# Patient Record
Sex: FEMALE | Race: WHITE
[De-identification: names, ages, dates, MRNs, and addresses within clinical notes are randomized per-mention and may not be internally consistent; named-entity substitution may affect disease eponyms.]

---

## 2017-05-27 ENCOUNTER — HOSPITAL ENCOUNTER (INPATIENT)
Dept: HOSPITAL 84 - D.ER | Age: 77
LOS: 3 days | Discharge: HOME | DRG: 392 | End: 2017-05-30
Attending: FAMILY MEDICINE | Admitting: FAMILY MEDICINE
Payer: MEDICARE

## 2017-05-27 VITALS
BODY MASS INDEX: 27.78 KG/M2 | WEIGHT: 177 LBS | HEIGHT: 67 IN | BODY MASS INDEX: 27.78 KG/M2 | BODY MASS INDEX: 27.78 KG/M2

## 2017-05-27 DIAGNOSIS — K21.0: Primary | ICD-10-CM

## 2017-05-27 DIAGNOSIS — I10: ICD-10-CM

## 2017-05-27 DIAGNOSIS — K91.89: ICD-10-CM

## 2017-05-27 DIAGNOSIS — E11.9: ICD-10-CM

## 2017-05-27 DIAGNOSIS — Z79.84: ICD-10-CM

## 2017-05-27 DIAGNOSIS — K22.70: ICD-10-CM

## 2017-05-27 DIAGNOSIS — K44.9: ICD-10-CM
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
## 2017-05-27 DIAGNOSIS — D62: ICD-10-CM

## 2017-05-27 DIAGNOSIS — K22.8: ICD-10-CM

## 2017-05-27 LAB
ALBUMIN SERPL-MCNC: 3.3 G/DL (ref 3.4–5)
ALP SERPL-CCNC: 82 U/L (ref 46–116)
ALT SERPL-CCNC: 37 U/L (ref 10–68)
ANION GAP SERPL CALC-SCNC: 13.4 MMOL/L (ref 8–16)
BASOPHILS NFR BLD AUTO: 0.3 % (ref 0–2)
BILIRUB SERPL-MCNC: 0.3 MG/DL (ref 0.2–1.3)
BUN SERPL-MCNC: 33 MG/DL (ref 7–18)
CALCIUM SERPL-MCNC: 9.2 MG/DL (ref 8.5–10.1)
CHLORIDE SERPL-SCNC: 103 MMOL/L (ref 98–107)
CO2 SERPL-SCNC: 27.2 MMOL/L (ref 21–32)
CREAT SERPL-MCNC: 1.2 MG/DL (ref 0.6–1.3)
EOSINOPHIL NFR BLD: 0.9 % (ref 0–7)
ERYTHROCYTE [DISTWIDTH] IN BLOOD BY AUTOMATED COUNT: 14.4 % (ref 11.5–14.5)
GLOBULIN SER-MCNC: 2.8 G/L
GLUCOSE SERPL-MCNC: 96 MG/DL (ref 74–106)
HCT VFR BLD CALC: 33 % (ref 36–48)
HGB BLD-MCNC: 11.1 G/DL (ref 12–16)
IMM GRANULOCYTES NFR BLD: 0.2 % (ref 0–5)
LYMPHOCYTES NFR BLD AUTO: 32.7 % (ref 15–50)
MCH RBC QN AUTO: 30 PG (ref 26–34)
MCHC RBC AUTO-ENTMCNC: 33.6 G/DL (ref 31–37)
MCV RBC: 89.2 FL (ref 80–100)
MONOCYTES NFR BLD: 7.5 % (ref 2–11)
NEUTROPHILS NFR BLD AUTO: 58.4 % (ref 40–80)
OSMOLALITY SERPL CALC.SUM OF ELEC: 285 MOSM/KG (ref 275–300)
PLATELET # BLD: 231 10X3/UL (ref 130–400)
PMV BLD AUTO: 9.6 FL (ref 7.4–10.4)
POTASSIUM SERPL-SCNC: 3.6 MMOL/L (ref 3.5–5.1)
PROT SERPL-MCNC: 6.1 G/DL (ref 6.4–8.2)
RBC # BLD AUTO: 3.7 10X6/UL (ref 4–5.4)
SODIUM SERPL-SCNC: 140 MMOL/L (ref 136–145)
WBC # BLD AUTO: 11.9 10X3/UL (ref 4.8–10.8)

## 2017-05-27 NOTE — OP
PATIENT NAME:  HANNAH COTA                           MEDICAL RECORD: [id number]
:42                                             LOCATION:[id number]
                                                         ADMISSION DATE:17
SURGEON:  JOSSUE BARRIOS MD            
 
 
DATE OF OPERATION:  2017
 
PREOPERATIVE DIAGNOSIS:  Upper gastrointestinal bleeding.
 
POSTOPERATIVE DIAGNOSES:
1.  Upper gastrointestinal bleeding with either a recurrent large hiatal hernia
or slipped Nissen.
2.  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
esophagus.
 
PROCEDURE:  Esophagogastroduodenoscopy with antral and distal esophageal
biopsies.
 
SURGEON:  Jossue Barrios MD.
 
ASSISTANT:  None.
 
BLOOD LOSS:  Minimal.
 
ANESTHESIA:  IV sedation.
 
COMPLICATIONS:  None.
 
Reason for the anesthesia staff being present during the procedure includes the
possible need for airway manipulation during the procedure, which was necessary.
 
The patient underwent a laparoscopic Nissen fundoplication by Dr. Alcaraz some
time last year.  She presents with gastrointestinal bleeding as well as
epigastric pain.  The examination was limited as the patient had a lot of
coughing and had episodes of desaturation during the procedure, so I was unable
to obtain as many distal esophageal biopsies as I would like to have been able
to obtain.  I noted no duodenal ulceration.  No gastric ulceration.
 
It was a little difficult to determine whether there had been a slipped Nissen
or a large recurrence of a hiatal hernia.  My recommendation to Dr. Daniels is to
continue with proton pump inhibitor therapy and refer the patient back to Dr. Alcaraz for possible revision of the operative procedure.
 
Also, I think the patient is going to require an upper GI and the radiology
department is not going to be willing to do this after the distal esophageal
biopsies today.
 
ENDOSCOPIC COURSE:  The patient was conveyed to the endoscopy suite urgently on
2017.  IV sedation was induced by anesthesia staff.  A bite block was
inserted.  A gastroscope was inserted into the mouth.  It was advanced easily
into the hypopharynx.  The esophagus was easily intubated as were the stomach
and duodenum.  Upon withdrawal, retroflexed and angulus views were obtained. 
Antral biopsies were obtained.  Distal esophageal biopsies were obtained.  The
endoscope was then withdrawn under direct vision.
 
The patient can be transferred back to our floor.  I anticipate she will likely
be able to be dismissed home tomorrow.
 
 
 
OPERATIVE REPORT                               [id number]    HANNAH COTA         
 
 
 
TRANSINT:[id number] Voice Confirmation ID: [id number] DOCUMENT ID: [id number]
                                           
                                           JOSSUE BARRIOS MD            
 
 
 
 
 
 
 
 
 
 
 
 
 
 
 
 
 
 
 
 
 
 
 
 
 
 
 
 
 
 
 
 
 
 
 
 
 
 
 
 
 
 
 
CC: ANNE LOZANO MD, MARIA GUADALUPE BRADSHAW MD, OLE DANIELS MD and [id number] MD
DICTATION DATE: 17 100     :     17 1136      ADM IN  
                                                                              
[facility]                                          
[address] [place] [address]

## 2017-05-27 NOTE — CN
PATIENT NAME:HANNAH COTA                               MEDICAL RECORD: [id number]
: 42                                              LOCATION:[facility]
ADMIT DATE: 17                                       ACCOUNT: [id number]
CONSULTING PHYSICIAN:    JOSSUE BARRIOS MD            
                                               
REFERRING PHYSICIAN:     ANNE LOZANO MD          
 
 
DATE OF CONSULTATION:  2017
 
HISTORY OF PRESENT ILLNESS:  The patient was admitted through the Emergency
Room.  This is a patient of Dr. Bradshaw.  She presented through the Emergency
Room with an upper GI bleed.  She had been having some hematemesis.  She had
been vomiting up clots for several days.  She also noted some very dark stools. 
She underwent a laparoscopic Nissen fundoplication by Dr. Alcaraz, she states
about a year ago.
 
She was evaluated in the Emergency Room by Dr. Padilla Quiroz as well as Dr. Hermosillo.  The symptoms are moderate severity.  She has been having some
epigastric pain.  She does have a history of peptic ulcer disease.  No recent
use of nonsteroidal anti-inflammatories.  Nothing aggravates.  Nothing
alleviates.  Symptoms are difficult to characterize.
 
She has been placed on proton pump inhibitor drip.  I am going to plan for an
upper endoscopy.
 
PAST MEDICAL AND SURGICAL HISTORY:  She wears glasses, non-insulin-dependent
diabetes mellitus, hypothyroidism, on replacement therapy, gastroesophageal
reflux, history of Nissen fundoplication, history of gastrointestinal bleeding,
history of dental implants, history of foot surgery.
 
ALLERGIES:  PENICILLIN AND SULFA.
 
HOME MEDICINES:  Levothyroxine, metformin, diltiazem, hydroxyzine as well as
amitriptyline.
 
FAMILY HISTORY:  Unknown.
 
SOCIAL HISTORY:  Never smoked tobacco.
 
REVIEW OF SYSTEMS:  Positive for fatigue and weakness.  No headache, no fever. 
Positive for nausea, hematemesis, abdominal pain.  No acute kidney disease, no
sweating, no rash.  No arthritis.
 
PHYSICAL EXAMINATION:
GENERAL:  She does appear acutely ill.  Does not appear chronically ill.
VITAL SIGNS:  Reviewed.
HEAD:  External ears appear normal.
EYES:  Extraocular movements are intact.
NECK:  Trachea is midline.
CHEST:  No intercostal retractions.
PULMONARY:  Nonlabored, no stridor.
ABDOMEN:  Nontender.
EXTREMITIES:  No peripheral cyanosis.
 
IMPRESSION:  Upper gastrointestinal bleeding.  The patient has had history of
peptic ulcer disease and Nissen fundoplication.
 
 
 
 
CONSULT REPORT                                 [id number] GALLOWAY             
 
 
PLAN:  Proton pump inhibitor drip.  EGD.  I have discussed this case personally
with Dr. Lozano.
 
TRANSINT:[id number] Voice Confirmation ID: [id number] DOCUMENT ID: [id number]
                                           
                                           JOSSUE BARRIOS MD            
 
 
 
 
 
 
 
 
 
 
 
 
 
 
 
 
 
 
 
 
 
 
 
 
 
 
 
 
 
 
 
 
 
 
 
 
 
 
 
 
 
CC: ANNE LOZANO MD and MARIA GUADALUPE BRADSHAW MD                  [id number]
DICTATION DATE: 17 0951     :     17 1146      ADM IN  
                                                                              
[place] [id number] [place] [id number]

## 2017-05-28 VITALS — DIASTOLIC BLOOD PRESSURE: 66 MMHG | SYSTOLIC BLOOD PRESSURE: 134 MMHG

## 2017-05-28 VITALS — DIASTOLIC BLOOD PRESSURE: 77 MMHG | SYSTOLIC BLOOD PRESSURE: 117 MMHG

## 2017-05-28 VITALS — DIASTOLIC BLOOD PRESSURE: 55 MMHG | SYSTOLIC BLOOD PRESSURE: 116 MMHG

## 2017-05-28 VITALS — DIASTOLIC BLOOD PRESSURE: 63 MMHG | SYSTOLIC BLOOD PRESSURE: 128 MMHG

## 2017-05-28 VITALS — DIASTOLIC BLOOD PRESSURE: 63 MMHG | SYSTOLIC BLOOD PRESSURE: 126 MMHG

## 2017-05-28 VITALS — SYSTOLIC BLOOD PRESSURE: 107 MMHG | DIASTOLIC BLOOD PRESSURE: 69 MMHG

## 2017-05-28 VITALS — SYSTOLIC BLOOD PRESSURE: 124 MMHG | DIASTOLIC BLOOD PRESSURE: 71 MMHG

## 2017-05-28 VITALS — SYSTOLIC BLOOD PRESSURE: 124 MMHG | DIASTOLIC BLOOD PRESSURE: 65 MMHG

## 2017-05-28 VITALS — SYSTOLIC BLOOD PRESSURE: 135 MMHG | DIASTOLIC BLOOD PRESSURE: 70 MMHG

## 2017-05-28 VITALS — DIASTOLIC BLOOD PRESSURE: 63 MMHG | SYSTOLIC BLOOD PRESSURE: 124 MMHG

## 2017-05-28 VITALS — SYSTOLIC BLOOD PRESSURE: 125 MMHG | DIASTOLIC BLOOD PRESSURE: 65 MMHG

## 2017-05-28 VITALS — SYSTOLIC BLOOD PRESSURE: 128 MMHG | DIASTOLIC BLOOD PRESSURE: 69 MMHG

## 2017-05-28 LAB
ANION GAP SERPL CALC-SCNC: 11.7 MMOL/L (ref 8–16)
BASOPHILS NFR BLD AUTO: 0.4 % (ref 0–2)
BASOPHILS NFR BLD AUTO: 0.5 % (ref 0–2)
BUN SERPL-MCNC: 31 MG/DL (ref 7–18)
CALCIUM SERPL-MCNC: 8 MG/DL (ref 8.5–10.1)
CHLORIDE SERPL-SCNC: 109 MMOL/L (ref 98–107)
CO2 SERPL-SCNC: 26.5 MMOL/L (ref 21–32)
CREAT SERPL-MCNC: 0.9 MG/DL (ref 0.6–1.3)
EOSINOPHIL NFR BLD: 1.1 % (ref 0–7)
EOSINOPHIL NFR BLD: 1.4 % (ref 0–7)
ERYTHROCYTE [DISTWIDTH] IN BLOOD BY AUTOMATED COUNT: 14.5 % (ref 11.5–14.5)
ERYTHROCYTE [DISTWIDTH] IN BLOOD BY AUTOMATED COUNT: 14.9 % (ref 11.5–14.5)
GLUCOSE SERPL-MCNC: 102 MG/DL (ref 74–106)
HCT VFR BLD CALC: 24.4 % (ref 36–48)
HCT VFR BLD CALC: 24.8 % (ref 36–48)
HGB BLD-MCNC: 8.3 G/DL (ref 12–16)
HGB BLD-MCNC: 8.5 G/DL (ref 12–16)
IMM GRANULOCYTES NFR BLD: 0.1 % (ref 0–5)
IMM GRANULOCYTES NFR BLD: 0.2 % (ref 0–5)
LYMPHOCYTES NFR BLD AUTO: 33 % (ref 15–50)
LYMPHOCYTES NFR BLD AUTO: 35.3 % (ref 15–50)
MCH RBC QN AUTO: 30.2 PG (ref 26–34)
MCH RBC QN AUTO: 30.6 PG (ref 26–34)
MCHC RBC AUTO-ENTMCNC: 34 G/DL (ref 31–37)
MCHC RBC AUTO-ENTMCNC: 34.3 G/DL (ref 31–37)
MCV RBC: 88.3 FL (ref 80–100)
MCV RBC: 90 FL (ref 80–100)
MONOCYTES NFR BLD: 4.1 % (ref 2–11)
MONOCYTES NFR BLD: 5.4 % (ref 2–11)
NEUTROPHILS NFR BLD AUTO: 58.7 % (ref 40–80)
NEUTROPHILS NFR BLD AUTO: 59.8 % (ref 40–80)
OSMOLALITY SERPL CALC.SUM OF ELEC: 293 MOSM/KG (ref 275–300)
PLATELET # BLD: 170 10X3/UL (ref 130–400)
PLATELET # BLD: 177 10X3/UL (ref 130–400)
PMV BLD AUTO: 9.1 FL (ref 7.4–10.4)
PMV BLD AUTO: 9.2 FL (ref 7.4–10.4)
POTASSIUM SERPL-SCNC: 3.2 MMOL/L (ref 3.5–5.1)
RBC # BLD AUTO: 2.71 10X6/UL (ref 4–5.4)
RBC # BLD AUTO: 2.81 10X6/UL (ref 4–5.4)
SODIUM SERPL-SCNC: 144 MMOL/L (ref 136–145)
WBC # BLD AUTO: 6.2 10X3/UL (ref 4.8–10.8)
WBC # BLD AUTO: 7.9 10X3/UL (ref 4.8–10.8)

## 2017-05-28 NOTE — NUR
SBFT COMPLETE.PT HAS HAD SHOWER AND PUT BACK IN BED.SHE HAD 1 LARGE LIQUID
STOOL BLACK IN COLOR.POSEY MAT ON AND FUNCTIONING.CALL LIGHT IN REACH.

## 2017-05-28 NOTE — NUR
PATIENT IS RESTING IN BED. NO DISTRESS NOTED. DENIES PAIN AT THIS TIME. WAS
C/O NAUSA. ADMINISTERED ZOFRAN AS PRESCRIBED. WILL CONT TO MONITOR. BED LOW,
LOCKED, CALL LIGHT IN REACH.

## 2017-05-28 NOTE — NUR
LARGE AMOUNTS OF BLACK LOOSE STOOL X3 TODAY.SHE HAS REMAINED WITHOUT
DISTRESS,BUT C/O SOME STOMACH CRAMPING.WITHOUT CHANGE.CONT PLAN OF CARE

## 2017-05-29 VITALS — SYSTOLIC BLOOD PRESSURE: 131 MMHG | DIASTOLIC BLOOD PRESSURE: 79 MMHG

## 2017-05-29 VITALS — SYSTOLIC BLOOD PRESSURE: 122 MMHG | DIASTOLIC BLOOD PRESSURE: 64 MMHG

## 2017-05-29 VITALS — SYSTOLIC BLOOD PRESSURE: 120 MMHG | DIASTOLIC BLOOD PRESSURE: 62 MMHG

## 2017-05-29 VITALS — SYSTOLIC BLOOD PRESSURE: 125 MMHG | DIASTOLIC BLOOD PRESSURE: 65 MMHG

## 2017-05-29 VITALS — DIASTOLIC BLOOD PRESSURE: 58 MMHG | SYSTOLIC BLOOD PRESSURE: 121 MMHG

## 2017-05-29 VITALS — SYSTOLIC BLOOD PRESSURE: 115 MMHG | DIASTOLIC BLOOD PRESSURE: 62 MMHG

## 2017-05-29 VITALS — DIASTOLIC BLOOD PRESSURE: 65 MMHG | SYSTOLIC BLOOD PRESSURE: 124 MMHG

## 2017-05-29 VITALS — DIASTOLIC BLOOD PRESSURE: 64 MMHG | SYSTOLIC BLOOD PRESSURE: 119 MMHG

## 2017-05-29 VITALS — DIASTOLIC BLOOD PRESSURE: 64 MMHG | SYSTOLIC BLOOD PRESSURE: 137 MMHG

## 2017-05-29 VITALS — DIASTOLIC BLOOD PRESSURE: 66 MMHG | SYSTOLIC BLOOD PRESSURE: 122 MMHG

## 2017-05-29 VITALS — SYSTOLIC BLOOD PRESSURE: 123 MMHG | DIASTOLIC BLOOD PRESSURE: 65 MMHG

## 2017-05-29 VITALS — SYSTOLIC BLOOD PRESSURE: 130 MMHG | DIASTOLIC BLOOD PRESSURE: 74 MMHG

## 2017-05-29 VITALS — SYSTOLIC BLOOD PRESSURE: 125 MMHG | DIASTOLIC BLOOD PRESSURE: 70 MMHG

## 2017-05-29 LAB
ANION GAP SERPL CALC-SCNC: 11 MMOL/L (ref 8–16)
BASOPHILS NFR BLD AUTO: 0.6 % (ref 0–2)
BUN SERPL-MCNC: 22 MG/DL (ref 7–18)
CALCIUM SERPL-MCNC: 8 MG/DL (ref 8.5–10.1)
CHLORIDE SERPL-SCNC: 114 MMOL/L (ref 98–107)
CO2 SERPL-SCNC: 27 MMOL/L (ref 21–32)
CREAT SERPL-MCNC: 0.9 MG/DL (ref 0.6–1.3)
EOSINOPHIL NFR BLD: 2.1 % (ref 0–7)
ERYTHROCYTE [DISTWIDTH] IN BLOOD BY AUTOMATED COUNT: 17.9 % (ref 11.5–14.5)
GLUCOSE SERPL-MCNC: 98 MG/DL (ref 74–106)
HCT VFR BLD CALC: 28.3 % (ref 36–48)
HGB BLD-MCNC: 9.4 G/DL (ref 12–16)
IMM GRANULOCYTES NFR BLD: 0.4 % (ref 0–5)
LYMPHOCYTES NFR BLD AUTO: 42.1 % (ref 15–50)
MCH RBC QN AUTO: 28.2 PG (ref 26–34)
MCHC RBC AUTO-ENTMCNC: 33.2 G/DL (ref 31–37)
MCV RBC: 85 FL (ref 80–100)
MONOCYTES NFR BLD: 6.6 % (ref 2–11)
NEUTROPHILS NFR BLD AUTO: 48.2 % (ref 40–80)
OSMOLALITY SERPL CALC.SUM OF ELEC: 298 MOSM/KG (ref 275–300)
PLATELET # BLD: 137 10X3/UL (ref 130–400)
PMV BLD AUTO: 9 FL (ref 7.4–10.4)
POTASSIUM SERPL-SCNC: 3 MMOL/L (ref 3.5–5.1)
RBC # BLD AUTO: 3.33 10X6/UL (ref 4–5.4)
SODIUM SERPL-SCNC: 149 MMOL/L (ref 136–145)
WBC # BLD AUTO: 5.2 10X3/UL (ref 4.8–10.8)

## 2017-05-29 PROCEDURE — 0DB38ZX EXCISION OF LOWER ESOPHAGUS, VIA NATURAL OR ARTIFICIAL OPENING ENDOSCOPIC, DIAGNOSTIC: ICD-10-PCS | Performed by: SURGERY

## 2017-05-29 PROCEDURE — 0DB68ZX EXCISION OF STOMACH, VIA NATURAL OR ARTIFICIAL OPENING ENDOSCOPIC, DIAGNOSTIC: ICD-10-PCS | Performed by: SURGERY

## 2017-05-29 NOTE — NUR
BACK FROM GI,PROCEDURE COMPLETE.PT AWAKE AND ALERT.WITHOUT SIGNS OF
DISTRESS.LARGE AMOUNT OF BLACK COLORED STOOL CLEANED FROM BED.PT ALSO INCO OF
URINE.MONITOR

## 2017-05-29 NOTE — NUR
ASESSMENT AS PER FLOWSHEET. IV PATENT LEFT ARM OF NS AT 125CC'S/HR. SITE
CLEAR. PROTONIX GTT INFUSING AT 10CC'S/HR. SR UP X2 CALL LIGHT WITHIN REACH
ALYSE MAT ON BED.

## 2017-05-29 NOTE — NUR
PATIENT LAYING IN BED ON HER RIGHT SIDE WITH NO COMPLAINTS. SHE RECIEVED
2UNITS PRBC DURING THE NIGHT. PIV NOTED TO THE LEFT AC WITH NS@125 AND A
PROTONIX DRIP AT 10mL/hr INFUSING. BED IN LOWEST LOCKED POSITION, HOB
ELEVATED, CALL LIGHT WITHIN REACH, x2 BEDRAILS UP, POSEY ALARM ON TABLE.

## 2017-05-30 VITALS — SYSTOLIC BLOOD PRESSURE: 155 MMHG | DIASTOLIC BLOOD PRESSURE: 74 MMHG

## 2017-05-30 VITALS — SYSTOLIC BLOOD PRESSURE: 110 MMHG | DIASTOLIC BLOOD PRESSURE: 60 MMHG

## 2017-05-30 VITALS — SYSTOLIC BLOOD PRESSURE: 116 MMHG | DIASTOLIC BLOOD PRESSURE: 45 MMHG

## 2017-05-30 LAB
BASOPHILS NFR BLD AUTO: 0.5 % (ref 0–2)
EOSINOPHIL NFR BLD: 2.3 % (ref 0–7)
ERYTHROCYTE [DISTWIDTH] IN BLOOD BY AUTOMATED COUNT: 18 % (ref 11.5–14.5)
HCT VFR BLD CALC: 27.3 % (ref 36–48)
HGB BLD-MCNC: 9.1 G/DL (ref 12–16)
IMM GRANULOCYTES NFR BLD: 0.2 % (ref 0–5)
LYMPHOCYTES NFR BLD AUTO: 37.5 % (ref 15–50)
MCH RBC QN AUTO: 28.4 PG (ref 26–34)
MCHC RBC AUTO-ENTMCNC: 33.3 G/DL (ref 31–37)
MCV RBC: 85.3 FL (ref 80–100)
MONOCYTES NFR BLD: 5.8 % (ref 2–11)
NEUTROPHILS NFR BLD AUTO: 53.7 % (ref 40–80)
PLATELET # BLD: 129 10X3/UL (ref 130–400)
PMV BLD AUTO: 9.4 FL (ref 7.4–10.4)
RBC # BLD AUTO: 3.2 10X6/UL (ref 4–5.4)
WBC # BLD AUTO: 6.1 10X3/UL (ref 4.8–10.8)

## 2017-05-30 NOTE — NUR
PATIENT SITTING UP IN BED ALERT EATING LUNCH. TOLERATING WELL. SCHEDULED
MEDICATION ADMINISTERED. IV TUBING CHANGED PER PROTOCOL. SIDE RAILS UP X2. BED
IN LOW POSITION. CALL LIGHT IN REACH.

## 2017-05-30 NOTE — NUR
ALERT IN BED EATING DINNER. TOLERATING WELL. SIDE RAILS UP X2. BED IN LOW
POSITION. CALL LIGHT IN REACH.

## 2017-05-30 NOTE — NUR
Patient Name: HANNAH COTA                                   Admission
Status: ER
Accout number: [id number]                            Admission Date:
2017
: 1940                                                      Admission
Diagnosis:
Attending: BLAKE                                              Current
LOS:  3
 
Anticipated DC Date:
2017
Planned Disposition: Home
Primary Insurance: [employer] IP PT B MED NEC [place] [place]
 
 
Discharge Planning Comments:
CM MET WITH PATIENT AND SPOUSE (TATO) REGARDING D/C NEEDS AND PLANS. PATIENT
STATED HER SPOUSE WILL DRIVE HER HOME AT DISCHARGE AND IT IS SAFE. PATIENT HAS
3 STEPS TO ENTER HOME AND NO STAIRS INSIDE. PATIENT IS INDEPENDENT WITH HER
CARE AND HAS CRUTCHES AND A CANE AT HOME. PATIENTS PCP IS DR. BRADSHAW AND
PHARMACY IS [facility]. PATIENT REFUSED HOME HEALTH AND DID NOT HAVE ANY
OTHER NEEDS FOR DISCHARGE. CM WILL CONTINUE TO FOLLOW PATIENT WITH D/C NEEDS
AND PLANS.
 
 
PCP  DR. LEEANN CHAN AT [facility]-  [phone]
TATO (SPOUSE)  [phone]
 
 
 
 
 
: Ruth Duke
 
Is the patient Alert and Oriented? Yes  0
* How many steps to enter\exit or inside your home? 3 W/RAILS  0
* PCP DR. BRADSHAW  0
* Pharmacy [facility]  0
* Preadmission Environment Home with Family  0
* ADLs Independent  0
* Equipment None  0
* List name and contact numbers for known caregivers / representatives who
currently or will assist patient after discharge: TATO (SPOUSE)  [phone]
 0
* Community resources currently utilized None  0
* Additional services required to return to the preadmission environment? Yes
0
* Can the patient safely return to the preadmission environment? Yes  0
* Has this patient been hospitalized within the prior 30 days at any hospital?
No  0
    Grand Total:  0

## 2017-05-30 NOTE — NUR
PATIENT ALERT IN BED EATING BREAKFAST. TOLERATING WELL. SCHEDULED MEDICATION
ADMINISTERED. SIDE RAILS UP X2. BED IN LOW POSITION. CALL LIGHT IN REACH.

## 2017-05-30 NOTE — NUR
PATIENT ALERT IN HIGH BRANDON POSITION. NO SIGNS OF DISTRESS NOTED. SIDE RAILS
UP X2. BED IN LOW POSITION. CALL LIGHT IN REACH. DENIES NEEDS.

## 2017-05-30 NOTE — NUR
PATIENT RECEIVED ALERT IN HIGH BRANDON POSITION. NO SIGNS OF DISTRESS NOTED.
DENIES NEEDS. SIDE RAILS UPX 2. BED IN LOW POSITION. CALL LIGHT IN REACH.

## 2018-10-09 ENCOUNTER — HOSPITAL ENCOUNTER (OUTPATIENT)
Dept: HOSPITAL 84 - D.FANS | Age: 78
Discharge: HOME | End: 2018-10-09
Attending: FAMILY MEDICINE
Payer: MEDICARE

## 2018-10-09 VITALS — BODY MASS INDEX: 31.39 KG/M2 | HEIGHT: 67 IN | WEIGHT: 200 LBS | BODY MASS INDEX: 31.39 KG/M2

## 2018-10-09 DIAGNOSIS — E11.65: Primary | ICD-10-CM

## 2019-12-10 ENCOUNTER — HOSPITAL ENCOUNTER (OUTPATIENT)
Dept: HOSPITAL 84 - D.CT | Age: 79
Discharge: HOME | End: 2019-12-10
Attending: INTERNAL MEDICINE
Payer: MEDICARE

## 2019-12-10 VITALS — BODY MASS INDEX: 31.3 KG/M2

## 2019-12-10 DIAGNOSIS — R11.10: ICD-10-CM

## 2019-12-10 DIAGNOSIS — K44.9: ICD-10-CM

## 2019-12-10 DIAGNOSIS — R10.13: Primary | ICD-10-CM

## 2019-12-10 DIAGNOSIS — K21.9: ICD-10-CM

## 2019-12-10 LAB
ANION GAP SERPL CALC-SCNC: 10.6 MMOL/L (ref 8–16)
BUN SERPL-MCNC: 15 MG/DL (ref 7–18)
CALCIUM SERPL-MCNC: 9.2 MG/DL (ref 8.5–10.1)
CHLORIDE SERPL-SCNC: 108 MMOL/L (ref 98–107)
CO2 SERPL-SCNC: 26.9 MMOL/L (ref 21–32)
CREAT SERPL-MCNC: 1.2 MG/DL (ref 0.6–1.3)
GLUCOSE SERPL-MCNC: 121 MG/DL (ref 74–106)
OSMOLALITY SERPL CALC.SUM OF ELEC: 284 MOSM/KG (ref 275–300)
POTASSIUM SERPL-SCNC: 3.5 MMOL/L (ref 3.5–5.1)
SODIUM SERPL-SCNC: 142 MMOL/L (ref 136–145)

## 2019-12-13 ENCOUNTER — HOSPITAL ENCOUNTER (OUTPATIENT)
Dept: HOSPITAL 84 - D.NM | Age: 79
Discharge: HOME | End: 2019-12-13
Attending: INTERNAL MEDICINE
Payer: MEDICARE

## 2019-12-13 VITALS — BODY MASS INDEX: 31.3 KG/M2

## 2019-12-13 DIAGNOSIS — R10.13: ICD-10-CM

## 2019-12-13 DIAGNOSIS — K21.9: ICD-10-CM

## 2019-12-13 DIAGNOSIS — Z98.890: ICD-10-CM

## 2019-12-13 DIAGNOSIS — K44.9: Primary | ICD-10-CM

## 2019-12-13 DIAGNOSIS — R11.10: ICD-10-CM

## 2019-12-16 ENCOUNTER — HOSPITAL ENCOUNTER (OUTPATIENT)
Dept: HOSPITAL 84 - D.RAD | Age: 79
Discharge: HOME | End: 2019-12-16
Attending: INTERNAL MEDICINE
Payer: MEDICARE

## 2019-12-16 VITALS — BODY MASS INDEX: 31.3 KG/M2

## 2019-12-16 DIAGNOSIS — R10.13: ICD-10-CM

## 2019-12-16 DIAGNOSIS — K21.9: ICD-10-CM

## 2019-12-16 DIAGNOSIS — K44.9: Primary | ICD-10-CM

## 2019-12-16 DIAGNOSIS — Z98.890: ICD-10-CM

## 2019-12-16 DIAGNOSIS — R11.10: ICD-10-CM

## 2020-02-20 ENCOUNTER — HOSPITAL ENCOUNTER (OUTPATIENT)
Dept: HOSPITAL 84 - D.LAB | Age: 80
Discharge: HOME | End: 2020-02-20
Attending: INTERNAL MEDICINE
Payer: MEDICARE

## 2020-02-20 VITALS — BODY MASS INDEX: 31.3 KG/M2

## 2020-02-20 DIAGNOSIS — J45.909: ICD-10-CM

## 2020-02-20 DIAGNOSIS — E78.5: ICD-10-CM

## 2020-02-20 DIAGNOSIS — G62.9: ICD-10-CM

## 2020-02-20 DIAGNOSIS — E03.9: ICD-10-CM

## 2020-02-20 DIAGNOSIS — R10.13: ICD-10-CM

## 2020-02-20 DIAGNOSIS — K76.0: Primary | ICD-10-CM

## 2020-02-20 DIAGNOSIS — K21.9: ICD-10-CM

## 2020-02-20 DIAGNOSIS — I25.10: ICD-10-CM

## 2020-02-20 DIAGNOSIS — I10: ICD-10-CM

## 2020-02-20 DIAGNOSIS — R19.7: ICD-10-CM

## 2020-02-20 LAB
ALBUMIN SERPL-MCNC: 3.9 G/DL (ref 3.4–5)
ALP SERPL-CCNC: 93 U/L (ref 30–120)
ALT SERPL-CCNC: 26 U/L (ref 10–68)
BASOPHILS NFR BLD AUTO: 0.6 % (ref 0–2)
BILIRUB DIRECT SERPL-MCNC: 0.19 MG/DL (ref 0–0.3)
BILIRUB INDIRECT SERPL-MCNC: 0.44 MG/DL (ref 0–1)
BILIRUB SERPL-MCNC: 0.63 MG/DL (ref 0.2–1.3)
EOSINOPHIL NFR BLD: 1.1 % (ref 0–7)
ERYTHROCYTE [DISTWIDTH] IN BLOOD BY AUTOMATED COUNT: 14.3 % (ref 11.5–14.5)
GLOBULIN SER-MCNC: 3 G/L
HCT VFR BLD CALC: 38.5 % (ref 36–48)
HGB BLD-MCNC: 12.9 G/DL (ref 12–16)
IMM GRANULOCYTES NFR BLD: 0.2 % (ref 0–5)
LYMPHOCYTES NFR BLD AUTO: 38.6 % (ref 15–50)
MCH RBC QN AUTO: 30 PG (ref 26–34)
MCHC RBC AUTO-ENTMCNC: 33.5 G/DL (ref 31–37)
MCV RBC: 89.5 FL (ref 80–100)
MONOCYTES NFR BLD: 7.7 % (ref 2–11)
NEUTROPHILS NFR BLD AUTO: 51.8 % (ref 40–80)
PLATELET # BLD: 115 10X3/UL (ref 130–400)
PMV BLD AUTO: 8.9 FL (ref 7.4–10.4)
PROT SERPL-MCNC: 6.9 G/DL (ref 6.4–8.2)
RBC # BLD AUTO: 4.3 10X6/UL (ref 4–5.4)
WBC # BLD AUTO: 5.3 10X3/UL (ref 4.8–10.8)

## 2020-08-18 ENCOUNTER — HOSPITAL ENCOUNTER (OUTPATIENT)
Dept: HOSPITAL 84 - D.US | Age: 80
Discharge: HOME | End: 2020-08-18
Attending: FAMILY MEDICINE
Payer: MEDICARE

## 2020-08-18 VITALS — BODY MASS INDEX: 31.3 KG/M2

## 2020-08-18 DIAGNOSIS — N18.9: Primary | ICD-10-CM
